# Patient Record
Sex: FEMALE | Race: WHITE | Employment: FULL TIME | URBAN - METROPOLITAN AREA
[De-identification: names, ages, dates, MRNs, and addresses within clinical notes are randomized per-mention and may not be internally consistent; named-entity substitution may affect disease eponyms.]

---

## 2017-02-09 PROCEDURE — 88175 CYTOPATH C/V AUTO FLUID REDO: CPT | Performed by: OBSTETRICS & GYNECOLOGY

## 2017-02-09 PROCEDURE — 87624 HPV HI-RISK TYP POOLED RSLT: CPT | Performed by: OBSTETRICS & GYNECOLOGY

## 2017-05-26 PROBLEM — M26.69 TEMPOROMANDIBULAR JOINT SOUNDS ON OPENING AND/OR CLOSING THE JAW: Status: ACTIVE | Noted: 2017-05-26

## 2017-05-26 PROBLEM — M26.69 TEMPOROMANDIBULAR JOINT SOUNDS ON OPENING AND/OR CLOSING THE JAW: Status: RESOLVED | Noted: 2017-05-26 | Resolved: 2017-05-26

## 2017-05-26 PROBLEM — M26.69 DERANGEMENT OF TEMPOROMANDIBULAR JOINT: Status: ACTIVE | Noted: 2017-05-26

## 2017-09-14 PROCEDURE — 81001 URINALYSIS AUTO W/SCOPE: CPT | Performed by: INTERNAL MEDICINE

## 2018-06-21 PROCEDURE — 87624 HPV HI-RISK TYP POOLED RSLT: CPT | Performed by: OBSTETRICS & GYNECOLOGY

## 2018-06-21 PROCEDURE — 88175 CYTOPATH C/V AUTO FLUID REDO: CPT | Performed by: OBSTETRICS & GYNECOLOGY

## 2018-07-11 PROCEDURE — 83002 ASSAY OF GONADOTROPIN (LH): CPT | Performed by: OBSTETRICS & GYNECOLOGY

## 2018-07-11 PROCEDURE — 83001 ASSAY OF GONADOTROPIN (FSH): CPT | Performed by: OBSTETRICS & GYNECOLOGY

## 2018-07-11 PROCEDURE — 86200 CCP ANTIBODY: CPT | Performed by: INTERNAL MEDICINE

## 2018-07-11 PROCEDURE — 82670 ASSAY OF TOTAL ESTRADIOL: CPT | Performed by: OBSTETRICS & GYNECOLOGY

## 2018-07-11 PROCEDURE — 81003 URINALYSIS AUTO W/O SCOPE: CPT | Performed by: INTERNAL MEDICINE

## 2018-07-19 PROCEDURE — 88305 TISSUE EXAM BY PATHOLOGIST: CPT | Performed by: SPECIALIST

## 2019-07-22 ENCOUNTER — OFFICE VISIT (OUTPATIENT)
Dept: OTHER | Facility: HOSPITAL | Age: 49
End: 2019-07-22
Attending: EMERGENCY MEDICINE

## 2019-07-22 DIAGNOSIS — Z00.00 ROUTINE GENERAL MEDICAL EXAMINATION AT A HEALTH CARE FACILITY: Primary | ICD-10-CM

## 2019-07-22 PROCEDURE — 86480 TB TEST CELL IMMUN MEASURE: CPT

## 2019-07-24 LAB
M TB IFN-G CD4+ T-CELLS BLD-ACNC: 0.02 IU/ML
M TB TUBERC IFN-G BLD QL: NEGATIVE
M TB TUBERC IGNF/MITOGEN IGNF CONTROL: 9.77 IU/ML
QUANTIFERON TB1 MINUS NIL: 0 IU/ML
QUANTIFERON TB2 MINUS NIL: 0 IU/ML

## 2020-03-23 PROBLEM — H04.123 DRY EYE SYNDROME OF BOTH EYES: Status: ACTIVE | Noted: 2020-03-23

## 2020-03-23 PROBLEM — H16.252 PHLYCTENULAR KERATOCONJUNCTIVITIS OF LEFT EYE: Status: ACTIVE | Noted: 2020-03-23

## 2020-03-28 PROBLEM — H40.033 ANATOMICAL NARROW ANGLE OF BOTH EYES: Status: ACTIVE | Noted: 2020-03-28

## 2020-03-28 PROBLEM — H20.9 IRITIS OF BOTH EYES: Status: ACTIVE | Noted: 2020-03-28

## 2020-07-05 PROBLEM — K14.6 BURNING MOUTH SYNDROME: Status: ACTIVE | Noted: 2020-07-05

## 2020-07-05 PROBLEM — B00.9 HSV INFECTION: Status: ACTIVE | Noted: 2020-07-05

## 2020-07-05 PROBLEM — N32.81 OAB (OVERACTIVE BLADDER): Status: ACTIVE | Noted: 2020-07-05

## 2020-07-05 PROBLEM — H04.123 DRY EYES: Status: ACTIVE | Noted: 2020-07-05

## 2020-07-05 PROBLEM — Z00.00 ROUTINE MEDICAL EXAM: Status: ACTIVE | Noted: 2020-07-05

## 2020-08-13 PROBLEM — N39.3 STRESS INCONTINENCE: Status: ACTIVE | Noted: 2020-08-13

## 2021-02-18 PROBLEM — R20.8 FACIAL BURNING: Status: ACTIVE | Noted: 2021-02-18

## 2021-10-27 ENCOUNTER — LAB ENCOUNTER (OUTPATIENT)
Dept: LAB | Facility: HOSPITAL | Age: 51
End: 2021-10-27
Attending: NURSE PRACTITIONER
Payer: COMMERCIAL

## 2021-10-27 DIAGNOSIS — N30.01 ACUTE CYSTITIS WITH HEMATURIA: ICD-10-CM

## 2021-10-27 PROCEDURE — 81003 URINALYSIS AUTO W/O SCOPE: CPT

## 2021-10-29 NOTE — PROGRESS NOTES
Released via [employer] this encounter as no further follow up needed at this time.

## 2022-01-24 PROBLEM — N94.9 VAGINAL BURNING: Status: ACTIVE | Noted: 2021-02-18
